# Patient Record
Sex: MALE | Race: WHITE | NOT HISPANIC OR LATINO | URBAN - METROPOLITAN AREA
[De-identification: names, ages, dates, MRNs, and addresses within clinical notes are randomized per-mention and may not be internally consistent; named-entity substitution may affect disease eponyms.]

---

## 2024-07-02 ENCOUNTER — APPOINTMENT (OUTPATIENT)
Dept: SURGERY | Facility: CLINIC | Age: 39
End: 2024-07-02

## 2024-07-02 DIAGNOSIS — K62.89 ANAL PAIN: ICD-10-CM

## 2024-07-02 DIAGNOSIS — K62.5 RECTAL BLEEDING: ICD-10-CM

## 2024-07-02 DIAGNOSIS — K60.2 ANAL FISSURE: Primary | ICD-10-CM

## 2024-07-02 PROCEDURE — 46600 DIAGNOSTIC ANOSCOPY SPX: CPT | Performed by: PHYSICIAN ASSISTANT

## 2024-07-02 PROCEDURE — 1036F TOBACCO NON-USER: CPT | Performed by: PHYSICIAN ASSISTANT

## 2024-07-02 PROCEDURE — 99203 OFFICE O/P NEW LOW 30 MIN: CPT | Performed by: PHYSICIAN ASSISTANT

## 2024-07-02 RX ORDER — SILDENAFIL CITRATE 100 MG/1
100 TABLET, FILM COATED ORAL
COMMUNITY
Start: 2024-06-17

## 2024-07-02 RX ORDER — AMLODIPINE BESYLATE 2.5 MG/1
TABLET ORAL
COMMUNITY
Start: 2024-06-17

## 2024-07-02 RX ORDER — ESCITALOPRAM OXALATE 10 MG/1
TABLET ORAL
COMMUNITY
Start: 2024-06-17

## 2024-07-02 RX ORDER — LOSARTAN POTASSIUM AND HYDROCHLOROTHIAZIDE 12.5; 5 MG/1; MG/1
2 TABLET ORAL DAILY
COMMUNITY

## 2024-07-02 NOTE — PROGRESS NOTES
Subjective   Patient ID: Tony Vasquez is a 39 y.o. male who presents for New Patient Visit (Anal fissure).    HPI  This is a 39-year-old male comes in with rectal pain, rectal bleeding and pain with defecation.  He had a fissure 2 years ago he does not believe it is ever healed.  He used to go to [facility] no longer goes.  Never had a colonoscopy.  He is here to find out what the next steps would be and if this is something that could be repaired    Review of Systems  Review of systems is negative other than what is mentioned above    Physical Exam  Eyes: Conjunctiva non -icteric and eye lids are without obvious rash or drooping. Pupils are symmetric.   Ears, Nose, Mouth, and Throat: External ears and nose appear to be without deformity or rash. No lesions or masses noted. Hearing is grossly intact.   Neck:. No JVD noted, tracheal position is midline. No thyromegaly, no thyroid nodules  Head and Face: Examination of the head and face revealed no abnormalities.   Respiratory: No gasping or shortness of breath noted, no use of accessory muscles noted.  Lungs are clear to auscultate  Cardiovascular: Examination for edema is normal, regular rate and rhythm S1-S2  GI: Abdomen non tender to palpation, bowel sounds are present  Skin: No rashes or open lesions/ulcers identified on skin.   Musk: Digits/nails show no clubbing or cyanosis. No asymmetry or masses noted of the musculature. Examination of the muscles/joints/bones show normal range of motion. Gait is grossly normally.   Neurologic: Cranial nerves II- XII intact, motor strength 5/5 muscle strength of the lower extremities bilaterally and equal.    Patient ID: Tony Vasquez is a 39 y.o. male.    Anoscopy    Date/Time: 7/2/2024 9:51 AM    Performed by: Nova Burns PA-C  Authorized by: Nova Burns PA-C    Consent:     Consent obtained:  Verbal    Consent given by:  Patient    Risks, benefits, and alternatives were  discussed: yes      Risks discussed:  Bleeding and pain    Alternatives discussed:  Observation  Procedure details:     Internal hemorrhoids: yes      Internal hemorrhoid position:  Four o'clock, eight o'clock and nine o'clock    Anal fissures: yes      Anal fissure position:  Five o'clock    Anal fistulae: yes      Anal stricture: no      Abscess: no      Tearing: no      Blood in rectal vault: no    Post-procedure details:     Procedure completion:  Tolerated  Comments:      Pt has at five/ six o clock region area of tissue inflamed and thickened       Objective     No diagnosis found.   There is no problem list on file for this patient.     No Known Allergies   Medication Documentation Review Audit       Reviewed by Coco Campuzano MA (Medical Assistant) on 07/02/24 at 0913      Medication Order Taking? Sig Documenting Provider Last Dose Status   amLODIPine (Norvasc) 2.5 mg tablet [id number] Yes 1 tabs, ORAL, DAILY, 90 tabs, Date: 6/17/24 8:17:00 AM EDT, [id number], 1 tabs ORAL DAILY Historical Provider, MD Taking Active   escitalopram (Lexapro) 10 mg tablet [id number] Yes 1 tabs, ORAL, DAILY, 90 tabs, Date: 6/17/24 8:17:00 AM EDT, [id number], 1 tabs ORAL DAILY Historical Provider, MD Taking Active   losartan-hydrochlorothiazide (Hyzaar) 50-12.5 mg tablet [id number] Yes Take 2 tablets by mouth once daily. Historical Provider, MD Taking Active   Viagra 100 mg tablet [id number] Yes 1 tablet (100 mg). Historical Provider, MD Taking Active                    Past Medical History:   Diagnosis Date    Personal history of other diseases of the circulatory system     H/O: HTN (hypertension)    Personal history of other diseases of the digestive system     H/O gastroesophageal reflux (GERD)    Personal history of other endocrine, nutritional and metabolic disease     History of high cholesterol    Personal history of other mental and behavioral disorders     History of anxiety     Social History     Tobacco  Use   Smoking Status Never   Smokeless Tobacco Never     No family history on file.   History reviewed. No pertinent surgical history.    Assessment/Plan   Today we had a discussion about exam under anesthesia, flexible sigmoidoscopy possible fissureectomy. Patient was informed that this would be done under general anesthesia.  He  will require a ride to and from the hospital.  She will require a colon prep for the surgery.  Risk and benefits were discussed such as bleeding and infection.  Patient had complete understanding of the procedure.  Patient would like to proceed.    Encounter Diagnoses   Name Primary?    Anal fissure Yes    Anal pain     Rectal bleeding      I have reviewed all data including labs,radiologic and previous reports.      **Portions of this medical record have been created using voice recognition software and may have minor errors which are inherent in voice recognition systems. It has not been fully edited for typographical or grammatical errors**

## 2024-09-09 ENCOUNTER — APPOINTMENT (OUTPATIENT)
Dept: SURGERY | Facility: CLINIC | Age: 39
End: 2024-09-09
Payer: COMMERCIAL

## 2024-09-09 DIAGNOSIS — Z98.890 S/P ANAL FISSURECTOMY: Primary | ICD-10-CM

## 2024-09-09 DIAGNOSIS — Z87.19 S/P ANAL FISSURECTOMY: Primary | ICD-10-CM

## 2024-09-09 PROCEDURE — 99024 POSTOP FOLLOW-UP VISIT: CPT | Performed by: PHYSICIAN ASSISTANT

## 2024-09-09 NOTE — PROGRESS NOTES
Subjective   Patient ID: Tony Vasquez is a 39 y.o. 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  Patient is doing well he is having normal bowel movements, they are formed normally.  Is having them daily.  No rectal bleeding.  A little bit of pain when he passes a stool but otherwise doing well    Review of Systems  Review of systems is negative other than what is mentioned above        Physical Exam  Examination of the rectal area good rectal tone no stricture healing well no active bleeding    Objective     No diagnosis found.   There is no problem list on file for this patient.     No Known Allergies   Medication Documentation Review Audit       Reviewed by Coco Campuzano MA (Medical Assistant) on 07/02/24 at 0913      Medication Order Taking? Sig Documenting Provider Last Dose Status   amLODIPine (Norvasc) 2.5 mg tablet [id number] Yes 1 tabs, ORAL, DAILY, 90 tabs, Date: 6/17/24 8:17:00 AM EDT, [id number], 1 tabs ORAL DAILY Historical Provider, MD Taking Active   escitalopram (Lexapro) 10 mg tablet [id number] Yes 1 tabs, ORAL, DAILY, 90 tabs, Date: 6/17/24 8:17:00 AM EDT, [id number], 1 tabs ORAL DAILY Historical Provider, MD Taking Active   losartan-hydrochlorothiazide (Hyzaar) 50-12.5 mg tablet [id number] Yes Take 2 tablets by mouth once daily. Historical MD Penny Taking Active   Viagra 100 mg tablet [id number] Yes 1 tablet (100 mg). Historical Provider, MD Taking Active                    Past Medical History:   Diagnosis Date    Personal history of other diseases of the circulatory system     H/O: HTN (hypertension)    Personal history of other diseases of the digestive system     H/O gastroesophageal reflux (GERD)    Personal history of other endocrine, nutritional and metabolic disease     History of high cholesterol    Personal history of other mental and  behavioral disorders     History of anxiety     Social History     Tobacco Use   Smoking Status Never   Smokeless Tobacco Never     No family history on file.   No past surgical history on file.    Assessment/Plan   No lifting over 10 to 12 pounds for 4 weeks  You may ride a stationary bike, you may use a treadmill, you may walk outside.  No squats, sit ups or lunges or core exercises for 4 weeks   You may drive a car  Follow-up as needed    Encounter Diagnosis   Name Primary?    S/P anal fissurectomy Yes     I have reviewed all data including labs,radiologic and previous reports.   **Portions of this medical record have been created using voice recognition software and may have minor errors which we are inherent in voice recognition systems it has not been fully edited for typographical or grammatical errors**      MELLISSA ThomasC